# Patient Record
Sex: FEMALE | Race: WHITE | URBAN - METROPOLITAN AREA
[De-identification: names, ages, dates, MRNs, and addresses within clinical notes are randomized per-mention and may not be internally consistent; named-entity substitution may affect disease eponyms.]

---

## 2018-05-09 ENCOUNTER — OFFICE VISIT (OUTPATIENT)
Dept: INTERNAL MEDICINE CLINIC | Facility: CLINIC | Age: 28
End: 2018-05-09

## 2018-05-09 ENCOUNTER — TELEPHONE (OUTPATIENT)
Dept: INTERNAL MEDICINE CLINIC | Facility: CLINIC | Age: 28
End: 2018-05-09

## 2018-05-09 VITALS
OXYGEN SATURATION: 98 % | SYSTOLIC BLOOD PRESSURE: 142 MMHG | BODY MASS INDEX: 26.98 KG/M2 | DIASTOLIC BLOOD PRESSURE: 80 MMHG | RESPIRATION RATE: 16 BRPM | HEART RATE: 88 BPM | HEIGHT: 64 IN | TEMPERATURE: 99 F | WEIGHT: 158 LBS

## 2018-05-09 DIAGNOSIS — G89.29 CHRONIC RADICULAR LUMBAR PAIN: ICD-10-CM

## 2018-05-09 DIAGNOSIS — M54.16 CHRONIC RADICULAR LUMBAR PAIN: ICD-10-CM

## 2018-05-09 DIAGNOSIS — Z00.00 ENCOUNTER FOR PREVENTATIVE ADULT HEALTH CARE EXAMINATION: Primary | ICD-10-CM

## 2018-05-09 DIAGNOSIS — G89.29 CHRONIC MIDLINE THORACIC BACK PAIN: ICD-10-CM

## 2018-05-09 DIAGNOSIS — M54.6 CHRONIC MIDLINE THORACIC BACK PAIN: ICD-10-CM

## 2018-05-09 PROBLEM — F40.00 AGORAPHOBIA: Status: ACTIVE | Noted: 2018-05-09

## 2018-05-09 PROBLEM — F11.90 CHRONIC NARCOTIC USE: Status: ACTIVE | Noted: 2018-05-09

## 2018-05-09 PROBLEM — F41.9 ANXIETY: Status: ACTIVE | Noted: 2018-04-12

## 2018-05-09 PROCEDURE — 99385 PREV VISIT NEW AGE 18-39: CPT | Performed by: INTERNAL MEDICINE

## 2018-05-09 RX ORDER — ALPRAZOLAM 0.25 MG/1
0.25 TABLET ORAL 2 TIMES DAILY PRN
Qty: 60 TABLET | Refills: 1 | Status: SHIPPED | OUTPATIENT
Start: 2018-05-09

## 2018-05-09 RX ORDER — HYDROCODONE BITARTRATE AND ACETAMINOPHEN 7.5; 325 MG/1; MG/1
1 TABLET ORAL EVERY 8 HOURS PRN
Qty: 90 TABLET | Refills: 0 | Status: SHIPPED | OUTPATIENT
Start: 2018-05-09

## 2018-05-09 RX ORDER — ALPRAZOLAM 0.25 MG/1
TABLET ORAL
Refills: 0 | COMMUNITY
Start: 2018-04-17 | End: 2018-05-09

## 2018-05-09 RX ORDER — HYDROCODONE BITARTRATE AND ACETAMINOPHEN 7.5; 325 MG/1; MG/1
TABLET ORAL
Refills: 0 | COMMUNITY
Start: 2018-02-27 | End: 2018-05-09

## 2018-05-09 NOTE — PATIENT INSTRUCTIONS
- Continue with xanax as needed. Be aware that this can make your daughter drowsy. - Continue with Norco as needed.   As above, this can make your daughter drowsy when breastfeeding.   - Get MRI's done at [facility].  - Follow up in 1-2 months to dis

## 2018-05-14 ENCOUNTER — MED REC SCAN ONLY (OUTPATIENT)
Dept: INTERNAL MEDICINE CLINIC | Facility: CLINIC | Age: 28
End: 2018-05-14